# Patient Record
Sex: MALE | Race: BLACK OR AFRICAN AMERICAN | Employment: OTHER | URBAN - METROPOLITAN AREA
[De-identification: names, ages, dates, MRNs, and addresses within clinical notes are randomized per-mention and may not be internally consistent; named-entity substitution may affect disease eponyms.]

---

## 2023-04-25 ENCOUNTER — HOSPITAL ENCOUNTER (OUTPATIENT)
Dept: RADIATION THERAPY | Age: 73
Discharge: HOME | End: 2023-04-25

## 2023-10-25 ENCOUNTER — HOSPITAL ENCOUNTER (OUTPATIENT)
Facility: HOSPITAL | Age: 73
Discharge: HOME | End: 2023-10-28

## 2023-10-25 VITALS
DIASTOLIC BLOOD PRESSURE: 70 MMHG | SYSTOLIC BLOOD PRESSURE: 126 MMHG | TEMPERATURE: 97.5 F | OXYGEN SATURATION: 98 % | WEIGHT: 165.1 LBS | HEART RATE: 75 BPM | RESPIRATION RATE: 16 BRPM

## 2023-10-25 DIAGNOSIS — C61 PROSTATE CANCER (HCC): Primary | ICD-10-CM

## 2023-10-25 RX ORDER — DILTIAZEM HYDROCHLORIDE 60 MG/1
TABLET, FILM COATED ORAL
COMMUNITY
Start: 2023-10-24

## 2023-10-25 RX ORDER — AMLODIPINE BESYLATE 10 MG/1
10 TABLET ORAL DAILY
COMMUNITY
Start: 2023-09-01

## 2023-10-25 RX ORDER — LISINOPRIL 20 MG/1
TABLET ORAL
COMMUNITY
Start: 2023-10-01

## 2023-10-25 ASSESSMENT — PAIN SCALES - GENERAL: PAINLEVEL_OUTOF10: 0

## 2024-01-10 PROBLEM — C61 MALIGNANT NEOPLASM OF PROSTATE (HCC): Status: ACTIVE | Noted: 2024-01-10

## 2024-01-11 ENCOUNTER — HOSPITAL ENCOUNTER (OUTPATIENT)
Facility: HOSPITAL | Age: 74
End: 2024-01-11

## 2024-01-11 VITALS
SYSTOLIC BLOOD PRESSURE: 147 MMHG | BODY MASS INDEX: 23.62 KG/M2 | WEIGHT: 165 LBS | DIASTOLIC BLOOD PRESSURE: 84 MMHG | HEIGHT: 70 IN | OXYGEN SATURATION: 99 % | RESPIRATION RATE: 16 BRPM | HEART RATE: 60 BPM

## 2024-01-11 DIAGNOSIS — C61 MALIGNANT NEOPLASM OF PROSTATE (HCC): Primary | ICD-10-CM

## 2024-01-11 RX ORDER — FLUTICASONE FUROATE AND VILANTEROL 100; 25 UG/1; UG/1
1 POWDER RESPIRATORY (INHALATION) DAILY
COMMUNITY

## 2024-01-11 ASSESSMENT — PAIN SCALES - GENERAL: PAINLEVEL_OUTOF10: 0

## 2024-01-11 NOTE — PROGRESS NOTES
[facility]  Oncology Social Work  Encounter     Patient Name:  Hoang Presley    Narrative: Met with patient and his brother to introduce  role and support. Patient with no questions or concerns for patient. Reviewed AMD and answered questions.     Reviewed support resources with patient including support groups. Provided my contact information and encouraged patient to contact me as needed for ongoing psychosocial support, patient voiced understanding.     Barriers to Care: none identified    Plan: Follow up as needed     Referral/Handouts:   Advance medical directive  
History     Socioeconomic History    Marital status:      Spouse name: Not on file    Number of children: Not on file    Years of education: Not on file    Highest education level: Not on file   Occupational History    Not on file   Tobacco Use    Smoking status: Never    Smokeless tobacco: Never   Substance and Sexual Activity    Alcohol use: Never    Drug use: Not on file    Sexual activity: Not on file   Other Topics Concern    Not on file   Social History Narrative    Not on file     Social Determinants of Health     Financial Resource Strain: Not on file   Food Insecurity: Not on file   Transportation Needs: Not on file   Physical Activity: Not on file   Stress: Not on file   Social Connections: Not on file   Intimate Partner Violence: Not on file   Housing Stability: Not on file       ALLERGIES / MEDICATIONS:   No Known Allergies  Current Outpatient Medications   Medication Sig Dispense Refill    fluticasone furoate-vilanterol (BREO ELLIPTA) 100-25 MCG/ACT inhaler Inhale 1 puff into the lungs daily      amLODIPine (NORVASC) 10 MG tablet Take 1 tablet by mouth daily      SYMBICORT 80-4.5 MCG/ACT AERO  (Patient not taking: Reported on 1/11/2024)      lisinopril (PRINIVIL;ZESTRIL) 20 MG tablet        No current facility-administered medications for this encounter.        PHYSICAL EXAM:   BSA: 1.92 meters squared  BP (!) 147/84   Pulse 60   Resp 16   Ht 1.778 m (5' 10\")   Wt 74.8 kg (165 lb)   SpO2 99%   BMI 23.68 kg/m²   Performance status: ECOG 0, fully active, able to carry on all predisease activities without restrictions  General:  No evidence of impaired alertness, inadequate appearance, premature or advanced chronologic age, uncooperativeness, developmental delays, altered mood and affect and disorientation.  Cardiovascular: No evidence of arterial pulse(s) abnormalities, abnormal heart rate, heart arrhythmia and abnormal heart sounds.  Respiratory:  No evidence of abnormal breath sounds, chest

## 2024-02-06 ENCOUNTER — HOSPITAL ENCOUNTER (OUTPATIENT)
Facility: HOSPITAL | Age: 74
Discharge: HOME | End: 2024-02-09

## 2024-02-06 VITALS
WEIGHT: 167.1 LBS | OXYGEN SATURATION: 99 % | DIASTOLIC BLOOD PRESSURE: 80 MMHG | RESPIRATION RATE: 18 BRPM | TEMPERATURE: 98 F | HEART RATE: 74 BPM | SYSTOLIC BLOOD PRESSURE: 169 MMHG | BODY MASS INDEX: 23.98 KG/M2

## 2024-02-06 DIAGNOSIS — C61 PROSTATE CANCER (HCC): Primary | ICD-10-CM

## 2024-02-06 ASSESSMENT — PAIN SCALES - GENERAL: PAINLEVEL_OUTOF10: 0

## 2024-02-06 NOTE — PROGRESS NOTES
bilaterally.  Pulmonary: No increased work of breathing. Symmetric chest rise  Rectal deferred  Skin: Warm, dry, no rashes noted.  Neurologic: Alert and oriented.  Psychiatric: Cooperative to commands and responds to questions appropriately.    Assessment & Plan   Mr. Presley is a 73 y.o. 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 Presley has elected to pass on additional radiotherapy.   He will keep his scheduled followup with us in December. He will continue to follow with urology as well as his other physicians.   -       Nilton Sanchez MD  Medical Director: Radiation Oncology  [address]  [address] [place] [address]  [phone]      Time and Counseling: I spent a total of 20 minutes in care of this patient during today's encounter on 2/6/24.    Carbon Copy:  Misha Michaels MD

## 2024-10-28 ENCOUNTER — TELEPHONE (OUTPATIENT)
Facility: HOSPITAL | Age: 74
End: 2024-10-28

## 2024-10-28 DIAGNOSIS — C61 MALIGNANT NEOPLASM OF PROSTATE (HCC): Primary | ICD-10-CM

## 2024-10-28 NOTE — TELEPHONE ENCOUNTER
Patient has elected to proceed with salvage SBRT to the prostate bed recurrence.  Will schedule pelvic MRI to better characterize the lesion followed by referral to urology to place fiducial markers in/around lesion.  Then we will schedule CT simulation for SBRT planning.    Would also recommend a course of ADT.  Will have urology schedule this in the near future as well.

## 2024-10-29 DIAGNOSIS — C61 MALIGNANT NEOPLASM OF PROSTATE (HCC): Primary | ICD-10-CM

## 2024-10-29 DIAGNOSIS — F40.240 CLAUSTROPHOBIA: ICD-10-CM

## 2024-10-29 RX ORDER — LORAZEPAM 0.5 MG/1
TABLET ORAL
Qty: 5 TABLET | Refills: 0 | Status: SHIPPED | OUTPATIENT
Start: 2024-10-29 | End: 2024-12-23

## 2025-01-09 ENCOUNTER — TELEPHONE (OUTPATIENT)
Facility: HOSPITAL | Age: 75
End: 2025-01-09

## 2025-01-09 NOTE — TELEPHONE ENCOUNTER
Called patient to go over results of recent pelvic MRI.  Pelvic MRI does not show any abnormal masses or lesions in the prostate bed and no abnormal enhancement in the prostate bed. Therefore, there is nothing to correlate the PSMA PET findings.    I advised I would not treat with SBRT based on the PSMA PET alone, as he has had previous radiation to the prostate bed before and therefore I would want a specific target that I could visualize on MRI as well as PET in order to move forward with treatment.  He did receive a 6 month camcevi depot in Nov.      He will continue to follow with urology in the interim with PSAs.